# Patient Record
Sex: MALE | Race: WHITE | NOT HISPANIC OR LATINO | URBAN - METROPOLITAN AREA
[De-identification: names, ages, dates, MRNs, and addresses within clinical notes are randomized per-mention and may not be internally consistent; named-entity substitution may affect disease eponyms.]

---

## 2022-12-28 ENCOUNTER — OFFICE VISIT (OUTPATIENT)
Dept: UROLOGY | Facility: CLINIC | Age: 22
End: 2022-12-28
Payer: COMMERCIAL

## 2022-12-28 DIAGNOSIS — Z30.09 VASECTOMY EVALUATION: Primary | ICD-10-CM

## 2022-12-28 PROCEDURE — 1160F PR REVIEW ALL MEDS BY PRESCRIBER/CLIN PHARMACIST DOCUMENTED: ICD-10-PCS | Mod: CPTII,S$GLB,, | Performed by: NURSE PRACTITIONER

## 2022-12-28 PROCEDURE — 1160F RVW MEDS BY RX/DR IN RCRD: CPT | Mod: CPTII,S$GLB,, | Performed by: NURSE PRACTITIONER

## 2022-12-28 PROCEDURE — 1159F PR MEDICATION LIST DOCUMENTED IN MEDICAL RECORD: ICD-10-PCS | Mod: CPTII,S$GLB,, | Performed by: NURSE PRACTITIONER

## 2022-12-28 PROCEDURE — 1159F MED LIST DOCD IN RCRD: CPT | Mod: CPTII,S$GLB,, | Performed by: NURSE PRACTITIONER

## 2022-12-28 PROCEDURE — 99203 OFFICE O/P NEW LOW 30 MIN: CPT | Mod: S$GLB,,, | Performed by: NURSE PRACTITIONER

## 2022-12-28 PROCEDURE — 99203 PR OFFICE/OUTPT VISIT, NEW, LEVL III, 30-44 MIN: ICD-10-PCS | Mod: S$GLB,,, | Performed by: NURSE PRACTITIONER

## 2022-12-28 NOTE — PROGRESS NOTES
"Subjective:       Patient ID: Francisco Gómez is a 22 y.o. male.    Chief Complaint: Vas Consult      HPI: 22-year-old male new to the service seen today for consultation on elective sterilization.  States he wants no children.  He has "always felt this way."  He brings with him his current significant other who also states she desires not to have any offspring as well.  No other urologic concerns       Past Medical History: History reviewed. No pertinent past medical history.    Past Surgical Historical: History reviewed. No pertinent surgical history.     Medications:      Past Social History:   Social History     Socioeconomic History    Marital status: Unknown       Allergies: Review of patient's allergies indicates:  No Known Allergies     Family History: History reviewed. No pertinent family history.     Review of Systems:  Review of Systems   Constitutional:  Negative for activity change and appetite change.   HENT:  Negative for congestion and dental problem.    Eyes:  Negative for visual disturbance.   Respiratory:  Negative for chest tightness and shortness of breath.    Cardiovascular:  Negative for chest pain.   Gastrointestinal:  Negative for abdominal distention and abdominal pain.   Genitourinary:  Negative for decreased urine volume, difficulty urinating, dysuria, enuresis, flank pain, frequency, genital sores, hematuria, penile discharge, penile pain, penile swelling, scrotal swelling, testicular pain and urgency.   Musculoskeletal:  Negative for back pain and neck pain.   Skin:  Negative for color change.   Neurological:  Negative for dizziness.   Hematological:  Negative for adenopathy.   Psychiatric/Behavioral:  Negative for agitation, behavioral problems and confusion.      Physical Exam:  Physical Exam  Constitutional:       Appearance: He is well-developed.   HENT:      Head: Normocephalic.   Eyes:      General: No scleral icterus.  Pulmonary:      Effort: Pulmonary effort is normal.      Breath " sounds: Normal breath sounds.   Abdominal:      General: There is no distension.      Palpations: Abdomen is soft.      Tenderness: There is no abdominal tenderness.      Hernia: No hernia is present. There is no hernia in the right inguinal area or left inguinal area.   Genitourinary:     Penis: Normal.       Testes: Normal. Cremasteric reflex is present.          Comments: Thin male--bilateral testes respective hemiscrotum.  Bilateral vas easily palpable  Musculoskeletal:      Cervical back: Normal range of motion.   Skin:     General: Skin is warm and dry.   Neurological:      Mental Status: He is alert and oriented to person, place, and time.       Assessment/Plan:   Sterile male desires elective sterilization--discussion today centered around risk versus benefits, the ability to reverse a vasectomy and potential successful outcome of same.  Question patient as to whether he he has considered making some sperm for future use should he have a change of mind.  He states he has not but has no desire again to have children and does not want to bank sperm.  I explained to him that I was put him on the nurse visit scheduled to sign consents but that will be on a day when Dr. Schumacher is in clinic so that Dr. Schumacher can come in and see the patient and also confirmed with the patient as professors to me today.  Patient is in total agreement with this.  Procedure was discussed in detail, questions answered to patient's satisfaction  Problem List Items Addressed This Visit    None

## 2023-02-03 DIAGNOSIS — Z30.09 VASECTOMY EVALUATION: Primary | ICD-10-CM

## 2023-02-03 RX ORDER — DIAZEPAM 10 MG/1
TABLET ORAL
Qty: 1 TABLET | Refills: 0 | Status: SHIPPED | OUTPATIENT
Start: 2023-02-03

## 2023-02-06 ENCOUNTER — CLINICAL SUPPORT (OUTPATIENT)
Dept: UROLOGY | Facility: CLINIC | Age: 23
End: 2023-02-06
Payer: COMMERCIAL

## 2023-02-06 DIAGNOSIS — Z30.09 VASECTOMY EVALUATION: Primary | ICD-10-CM

## 2023-02-06 NOTE — PROGRESS NOTES
Education and consents for yanet. Pt states understanding of all verbal and written instructions. Gave script for valium to take prior to procedure.

## 2023-02-27 ENCOUNTER — TELEPHONE (OUTPATIENT)
Dept: UROLOGY | Facility: CLINIC | Age: 23
End: 2023-02-27
Payer: COMMERCIAL

## 2023-02-27 NOTE — TELEPHONE ENCOUNTER
Attempted to  call home number and was his mothers phone.  No involvement of care found to speak to mom.

## 2023-02-28 ENCOUNTER — PROCEDURE VISIT (OUTPATIENT)
Dept: UROLOGY | Facility: CLINIC | Age: 23
End: 2023-02-28
Payer: COMMERCIAL

## 2023-02-28 VITALS
HEART RATE: 68 BPM | WEIGHT: 167.25 LBS | DIASTOLIC BLOOD PRESSURE: 68 MMHG | HEIGHT: 70 IN | BODY MASS INDEX: 23.94 KG/M2 | RESPIRATION RATE: 18 BRPM | OXYGEN SATURATION: 100 % | SYSTOLIC BLOOD PRESSURE: 124 MMHG

## 2023-02-28 DIAGNOSIS — Z30.2 ENCOUNTER FOR MALE STERILIZATION PROCEDURE: Primary | ICD-10-CM

## 2023-02-28 PROCEDURE — 55250 VASECTOMY: ICD-10-PCS | Mod: S$GLB,,, | Performed by: UROLOGY

## 2023-02-28 PROCEDURE — 55250 REMOVAL OF SPERM DUCT(S): CPT | Mod: S$GLB,,, | Performed by: UROLOGY

## 2023-02-28 RX ORDER — HYDROCODONE BITARTRATE AND ACETAMINOPHEN 10; 325 MG/1; MG/1
1 TABLET ORAL EVERY 6 HOURS PRN
Qty: 10 TABLET | Refills: 0 | Status: SHIPPED | OUTPATIENT
Start: 2023-02-28

## 2023-02-28 NOTE — PROCEDURES
"Vasectomy    Date/Time: 2/28/2023 8:30 AM  Performed by: Lázaro Schumacher MD  Authorized by: Ash Junior NP     Consent Done?:  Yes (Written)  Time out: Immediately prior to procedure a "time out" was called to verify the correct patient, procedure, equipment, support staff and site/side marked as required.    Indications:  [place] male  Patient sedated: No    Preparation: Patient was prepped and draped in usual sterile fashion    Incisions:  1  Length vas excised:  2.5 cm  Vas:  Fulgurated and Buried  Sutures:  3-0 chromic SH  Same procedure performed on both sides    Patient tolerance:  Patient tolerated the procedure well with no immediate complications     Patient was brought to the procedure room placed on table in supine position he was then prepped and draped in the usual sterile fashion. A 2 cm incision was made upper portion of the midline of the scrotum secured into the dartos fascia of the right vas deferens was isolated and skeletonized using the Vas clamps, and at this point a 2 cm segment of the vas deferens was excised, both ends of the vas were fulgurated the proximal end was dunked into the surrounding dartos and pursestring closed the that is on the right side was returned to the scrotum the identical procedure was done on the contralateral side the incision was closed with a 3 O chromic suture patient tolerated the procedure with oral complications.  "

## 2023-02-28 NOTE — PATIENT INSTRUCTIONS
NO STRENUOUS ACTIVITY FOR 7 DAYS  NO SEXUAL INTERCOURSE FOR 7-14 DAYS  Patient Education       Vasectomy Discharge Instructions   About this topic   A vasectomy is a permanent type of birth control. After this surgery, you are very unlikely to get a person pregnant. Sperm are made in the testes. The testes are small round organs located in the skin sac that hangs between your legs. Sperm are stored in a small organ on top of the testes. The organ is called the epididymis. The sperm travel from the epididymis through a small tube called the vas deferens when you ejaculate. The fluid you ejaculate is called semen. The sperm in this fluid can cause a pregnancy.  The doctor cuts or blocks the vas deferens during a vasectomy. After this procedure, you will still ejaculate but the semen will not contain any sperm.     What care is needed at home?   Ask your doctor what you need to do when you go home. Make sure you ask questions if you do not understand what the doctor says. Asking questions will help you know what you need to do.  Talk to your doctor about how to care for your cut sites. Ask your doctor about:  When you should change your bandages  When you may take a bath or shower  If you need to be careful with lifting things over 10 pounds (4.5 kg)  When you can go back to your normal activities like work and driving.  Always wash your hands before and after touching your wound or dressing.  Place an ice pack or a bag of frozen peas wrapped in a towel over the painful part. Never put ice right on the skin. Do not leave the ice on more than 10 to 15 minutes at a time.  Wear supportive clothing, like a jockstrap or jockey shorts, while your wound heals.  Relax and keep your feet raised when you get home. Lying on your back may feel most comfortable.  You may have blood in your semen for the first few weeks. This is normal.  You may have sex after 1 to 4 weeks or when your doctor says so. You can still get your partner  pregnant during this time. You need to use other forms of birth control to prevent pregnancy.  You may have to wait up to 3 months to have a zero sperm count. You will need to get your sperm count checked. Use birth control until your sperm count is checked and is zero.  What follow-up care is needed?   Your doctor may ask you to make visits to the office to check on your progress. Be sure to keep your visits.  You may have stitches or staples. If so, your doctor will often want to remove the stitches or staples in 1 to 2 weeks.  You will need to follow up with your doctor to have your sperm count checked after about 3 months. Checking with your doctor is an important step in making sure you are not able to get a person pregnant.  What drugs may be needed?   The doctor may order drugs to:  Help with pain and swelling  Prevent infection  Will physical activity be limited?   You may need to rest for a few days after the procedure. Avoid standing or walking too long. Avoid heavy lifting and hard exercise for up to 1 to 3 weeks.  What problems could happen?   Infection  Bleeding and bruising  Swelling  Pain  Opening of surgical wound  Blood supply to the testicle is damaged  There are still sperm in your semen  When do I need to call the doctor?   Signs of infection such as a fever of 100.4°F (38°C) or higher, chills, pain with passing urine.  Signs of wound infection such as swelling, redness, warmth around the wound; too much pain when touched; yellowish, greenish, or bloody discharge; foul smell coming from the cut site; cut site opens up.  Very bad pain in your scrotum  Problems with your erection or ejaculation  You are not feeling better in 2 to 3 days or you are feeling worse  Helpful tips   A vasectomy is meant to be a permanent form of contraception. You should be 100% sure before you undergo this procedure. You and your partner should have talked about this procedure thoroughly. Vasectomy reversal surgery is  available, but it is complicated and does not always work.  A vasectomy does not protect you from sexually-transmitted diseases. You will still need to wear a condom to protect yourself and your partner against these infections.  Teach Back: Helping You Understand   The Teach Back Method helps you understand the information we are giving you. After you talk with the staff, tell them in your own words what you learned. This helps to make sure the staff has described each thing clearly. It also helps to explain things that may have been confusing. Before going home, make sure you can do these:  I can tell you about my procedure.  I can tell you what may help ease my pain.  I can tell you how to care for my cut site.  I can tell you what I will do if I have a fever, chills, or bad pain.  Where can I learn more?   [employer] Academy [employer]  https://familydoctor.org/vasectomy-what-to-expect/   [employer]  https://www.betterhealth.compa.gov.[place]/health/healthyliving/contraception-vasectomy   [facility] Choices  https://www.nhs.uk/Conditions/contraception-guide/Pages/vasectomy-male-sterilisation.aspx   Last Reviewed Date   2021-06-07  Consumer Information Use and Disclaimer   This information is not specific medical advice and does not replace information you receive from your health care provider. This is only a brief summary of general information. It does NOT include all information about conditions, illnesses, injuries, tests, procedures, treatments, therapies, discharge instructions or life-style choices that may apply to you. You must talk with your health care provider for complete information about your health and treatment options. This information should not be used to decide whether or not to accept your health care providers advice, instructions or recommendations. Only your health care provider has the knowledge and training to provide advice that is right for you.  Copyright   Copyright © 2021  [employer] and its affiliates and/or licensors. All rights reserved.

## 2023-06-23 LAB
ABSTINENCE: 6 DAYS (ref 3–7)
Lab: ABNORMAL
MICROSCOPIC EXAM: ABNORMAL
MOTILITY: ABNORMAL
RECEIVED WITHIN 1 HOUR: YES
SPERM SEEN: ABNORMAL SPERM/HPF
TIME DELIVERED: ABNORMAL
VOLUME: 3.5 ML

## 2023-06-30 ENCOUNTER — TELEPHONE (OUTPATIENT)
Dept: UROLOGY | Facility: CLINIC | Age: 23
End: 2023-06-30
Payer: COMMERCIAL

## 2023-06-30 NOTE — TELEPHONE ENCOUNTER
Notified patient that his semen analysis results came in & that he is now sterile. Verbalized understanding                ----- Message from Leda Jay NP sent at 6/29/2023  9:23 AM CDT -----  Dr. Schumacher reviewed results. Please notify patient that he is sterile based on his semen analysis. Follow up as needed.